# Patient Record
(demographics unavailable — no encounter records)

---

## 2025-01-16 NOTE — PHYSICAL EXAM
[No Acute Distress] : no acute distress [Normal Sclera/Conjunctiva] : normal sclera/conjunctiva [Normal Outer Ear/Nose] : the outer ears and nose were normal in appearance [No JVD] : no jugular venous distention [de-identified] : R LATERAL ELBOW TENDERNESS;FROM LEFT THUMB

## 2025-01-16 NOTE — HISTORY OF PRESENT ILLNESS
[de-identified] : CC OF R ELBOW PAIN FOR MONTHS ;UNABLE TO CARRY OR LIFT ANYTHING WITHOUT PAIN ALSO CC OF LEFT THUMB PAIN FOR MONTHS  PLAYS GUITAR AND PIANO FREQUENTLY

## 2025-01-16 NOTE — ASSESSMENT
[FreeTextEntry1] : 61 Y OLD MALE WITH R LATERAL EPICONDYLITIS AND LEFT THUMB TENDINITIS = ORTHO EVAL

## 2025-01-29 NOTE — ADDENDUM
[FreeTextEntry1] : This note was written by Nara Vinson on 01/27/2025 acting solely as a scribe for Dr. Ming Solomon.   All medical record entries made by the Scribe were at my, Dr. Ming Solomon, direction and personally dictated by me on 01/27/2025. I have personally reviewed the chart and agree that the record accurately reflects my personal performance of the history, physical exam, assessment and plan.

## 2025-01-29 NOTE — PHYSICAL EXAM
[de-identified] : Right elbow exam  Skin: Clean, dry, intact. No ecchymosis. No swelling. No palpable joint effusion. ROM: Full extension/flexion, full supination/pronation.   Painful ROM: Lateral elbow pain with resisted wrist extension Tenderness: Positive lateral epicondyle pain (ECRB). No olecranon pain. No pain at radial head. No pain to radial tunnel. Strength: 5/5 elbow flexion, 5/5 elbow extension, 5/5 supination, 5/5 pronation Stability: Stable to vaus/valgus stress Vasc: 2+ radial pulse, <2s cap refill Sensation: In tact to light touch throughout Neuro: Negative tinels at ulnar canal, AIN/PIN/Ulnar nerve intact to motor/sensation. [de-identified] : The following radiographs were ordered and read by me during this patients visit. I reviewed each radiograph in detail with the patient and discussed the findings as highlighted below.   3 views of the right elbow were obtained, 01/27/2025, that show no acute fracture or dislocation. There is no degenerative change seen. There is no gross malalignment. No significant other obvious osseous abnormality, otherwise unremarkable.  Cystic changes at the biceps insertion.

## 2025-01-29 NOTE — PHYSICAL EXAM
[de-identified] : Right elbow exam  Skin: Clean, dry, intact. No ecchymosis. No swelling. No palpable joint effusion. ROM: Full extension/flexion, full supination/pronation.   Painful ROM: Lateral elbow pain with resisted wrist extension Tenderness: Positive lateral epicondyle pain (ECRB). No olecranon pain. No pain at radial head. No pain to radial tunnel. Strength: 5/5 elbow flexion, 5/5 elbow extension, 5/5 supination, 5/5 pronation Stability: Stable to vaus/valgus stress Vasc: 2+ radial pulse, <2s cap refill Sensation: In tact to light touch throughout Neuro: Negative tinels at ulnar canal, AIN/PIN/Ulnar nerve intact to motor/sensation. [de-identified] : The following radiographs were ordered and read by me during this patients visit. I reviewed each radiograph in detail with the patient and discussed the findings as highlighted below.   3 views of the right elbow were obtained, 01/27/2025, that show no acute fracture or dislocation. There is no degenerative change seen. There is no gross malalignment. No significant other obvious osseous abnormality, otherwise unremarkable.  Cystic changes at the biceps insertion.

## 2025-01-29 NOTE — PROCEDURE
[de-identified] : Injection: Right elbow. Indication: Lateral epicondylitis.   A discussion was had with the patient regarding this procedure and all questions were answered. All risks, benefits and alternatives were discussed. These included but were not limited to bleeding, infection, and allergic reaction. Alcohol was used to clean the skin, and betadine was used to sterilize and prep the area in the lateral aspect of the right elbow. Ethyl chloride spray was then used as a topical anesthetic. A 25-gauge needle was used to inject 2cc of 1% lidocaine and 1cc of 40mg/ml methylprednisolone into the right lateral epicondyle using a needling technique. A sterile bandage was then applied. The patient tolerated the procedure well and there were no complications.

## 2025-01-29 NOTE — DISCUSSION/SUMMARY
[de-identified] : 62 y/o male with right elbow lateral epicondylitis.   I discussed with the patient the treatment of lateral epicondylitis. I discussed that this is a degenerative condition rather than an inflammatory condition and that the process tends to be reversible (albeit can last from 6-24mos if left untreated). The best source of treatment is to reduce the offending repetitive overuse injury process, and I counseled the patient on how to do this. First line treatment can include watchful waiting for a period of 6-8 wks with specific activity modification and OTC NSAID's/Tylenol. Further treatment includes the use of counterforce bracing, physical therapy for stretching and strengthening, and the use of injection therapy (steroids/PRP etc). I also discussed the potential role of surgical intervention for chronic symptoms that persist greater than 6-12 months.  At this time as treatment, I recommended a period of relative rest, stretching and strengthening modalities as indicated in a patient handout provided as well as counterforce bracing.   Injection therapy was provided for therapeutic and symptomatic relief.    We will see the patient back as needed.

## 2025-01-29 NOTE — HISTORY OF PRESENT ILLNESS
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 NO injury reported. The pain presents with lifting, pushing and pulling. He is not taking pain medication. Patient localizes pain to the lateral aspect of the elbow. Patient was seen in left lateral epicondylitis 2019.

## 2025-01-29 NOTE — PROCEDURE
[de-identified] : Injection: Right elbow. Indication: Lateral epicondylitis.   A discussion was had with the patient regarding this procedure and all questions were answered. All risks, benefits and alternatives were discussed. These included but were not limited to bleeding, infection, and allergic reaction. Alcohol was used to clean the skin, and betadine was used to sterilize and prep the area in the lateral aspect of the right elbow. Ethyl chloride spray was then used as a topical anesthetic. A 25-gauge needle was used to inject 2cc of 1% lidocaine and 1cc of 40mg/ml methylprednisolone into the right lateral epicondyle using a needling technique. A sterile bandage was then applied. The patient tolerated the procedure well and there were no complications.

## 2025-01-29 NOTE — DISCUSSION/SUMMARY
[de-identified] : 60 y/o male with right elbow lateral epicondylitis.   I discussed with the patient the treatment of lateral epicondylitis. I discussed that this is a degenerative condition rather than an inflammatory condition and that the process tends to be reversible (albeit can last from 6-24mos if left untreated). The best source of treatment is to reduce the offending repetitive overuse injury process, and I counseled the patient on how to do this. First line treatment can include watchful waiting for a period of 6-8 wks with specific activity modification and OTC NSAID's/Tylenol. Further treatment includes the use of counterforce bracing, physical therapy for stretching and strengthening, and the use of injection therapy (steroids/PRP etc). I also discussed the potential role of surgical intervention for chronic symptoms that persist greater than 6-12 months.  At this time as treatment, I recommended a period of relative rest, stretching and strengthening modalities as indicated in a patient handout provided as well as counterforce bracing.   Injection therapy was provided for therapeutic and symptomatic relief.    We will see the patient back as needed.

## 2025-01-29 NOTE — HISTORY OF PRESENT ILLNESS
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 NO injury reported. The pain presents with lifting, pushing and pulling. He is not taking pain medication. Patient localizes pain to the lateral aspect of the elbow. Patient was seen in left lateral epicondylitis 2019.